# Patient Record
Sex: FEMALE | Race: WHITE | NOT HISPANIC OR LATINO | URBAN - METROPOLITAN AREA
[De-identification: names, ages, dates, MRNs, and addresses within clinical notes are randomized per-mention and may not be internally consistent; named-entity substitution may affect disease eponyms.]

---

## 2017-07-13 ENCOUNTER — TRANSFERRED RECORDS (OUTPATIENT)
Dept: HEALTH INFORMATION MANAGEMENT | Facility: CLINIC | Age: 77
End: 2017-07-13

## 2017-08-10 ENCOUNTER — OFFICE VISIT (OUTPATIENT)
Dept: OPHTHALMOLOGY | Facility: CLINIC | Age: 77
End: 2017-08-10
Attending: OPHTHALMOLOGY
Payer: MEDICARE

## 2017-08-10 DIAGNOSIS — H35.52 RP (RETINITIS PIGMENTOSA): Primary | ICD-10-CM

## 2017-08-10 DIAGNOSIS — H91.93 BILATERAL HEARING LOSS, UNSPECIFIED HEARING LOSS TYPE: ICD-10-CM

## 2017-08-10 PROCEDURE — T1013 SIGN LANG/ORAL INTERPRETER: HCPCS | Mod: U3,ZF

## 2017-08-10 PROCEDURE — 99213 OFFICE O/P EST LOW 20 MIN: CPT | Mod: ZF

## 2017-08-10 RX ORDER — POLYVINYL ALCOHOL 14 MG/ML
1 SOLUTION/ DROPS OPHTHALMIC
COMMUNITY
Start: 2016-03-22

## 2017-08-10 RX ORDER — IBUPROFEN 400 MG/1
400 TABLET, FILM COATED ORAL
COMMUNITY
Start: 2016-05-04

## 2017-08-10 RX ORDER — RISPERIDONE 2 MG/1
2 TABLET, ORALLY DISINTEGRATING ORAL
COMMUNITY
Start: 2017-03-03

## 2017-08-10 RX ORDER — CELECOXIB 100 MG/1
100 CAPSULE ORAL
COMMUNITY
Start: 2017-06-22

## 2017-08-10 RX ORDER — POLYETHYLENE GLYCOL 3350 17 G/17G
1 POWDER, FOR SOLUTION ORAL
COMMUNITY
Start: 2017-06-02

## 2017-08-10 RX ORDER — CALCIUM CARBONATE 500 MG/1
500 TABLET, CHEWABLE ORAL
COMMUNITY
Start: 2016-03-22

## 2017-08-10 RX ORDER — ASPIRIN 81 MG/1
81 TABLET ORAL
COMMUNITY
Start: 2016-09-08

## 2017-08-10 RX ORDER — ATORVASTATIN CALCIUM 20 MG/1
20 TABLET, FILM COATED ORAL
COMMUNITY
Start: 2017-07-14

## 2017-08-10 RX ORDER — ASPIRIN 81 MG
100 TABLET, DELAYED RELEASE (ENTERIC COATED) ORAL
COMMUNITY
Start: 2017-07-14

## 2017-08-10 RX ORDER — ACETAMINOPHEN 325 MG/1
TABLET ORAL
COMMUNITY
Start: 2016-04-22

## 2017-08-10 RX ORDER — FLUTICASONE PROPIONATE 50 MCG
SPRAY, SUSPENSION (ML) NASAL
COMMUNITY
Start: 2017-05-24

## 2017-08-10 ASSESSMENT — CONF VISUAL FIELD
OD_SUPERIOR_TEMPORAL_RESTRICTION: 1
OD_INFERIOR_TEMPORAL_RESTRICTION: 1
OS_SUPERIOR_NASAL_RESTRICTION: 1
OD_SUPERIOR_NASAL_RESTRICTION: 1
OS_INFERIOR_NASAL_RESTRICTION: 1
OD_INFERIOR_NASAL_RESTRICTION: 1
OS_INFERIOR_TEMPORAL_RESTRICTION: 1
OS_SUPERIOR_TEMPORAL_RESTRICTION: 1

## 2017-08-10 ASSESSMENT — VISUAL ACUITY
OD_SC: LP
METHOD: SNELLEN - LINEAR
OS_SC: LP

## 2017-08-10 ASSESSMENT — SLIT LAMP EXAM - LIDS
COMMENTS: NORMAL
COMMENTS: NORMAL

## 2017-08-10 ASSESSMENT — EXTERNAL EXAM - LEFT EYE: OS_EXAM: NORMAL

## 2017-08-10 ASSESSMENT — TONOMETRY
IOP_METHOD: TONOPEN
OS_IOP_MMHG: 24
OD_IOP_MMHG: 22

## 2017-08-10 ASSESSMENT — EXTERNAL EXAM - RIGHT EYE: OD_EXAM: NORMAL

## 2017-08-10 NOTE — NURSING NOTE
Chief Complaints and History of Present Illnesses   Patient presents with     Consult For     blindness of BE     HPI    Affected eye(s):  Both   Symptoms:     Tearing   No itching   No burning   Eye discharge (Comment: BE)      Frequency:  Constant       Do you have eye pain now?:  No      Comments:  Referred by Dr Derrell Soto  Pt is deaf and blind  Pt unable to tell what caused blindness and when   Lisa Diaz COA 4:03 PM August 10, 2017

## 2017-08-10 NOTE — PROGRESS NOTES
I have confirmed the patient's and reviewed Past Medical History, Past Surgical History, Social History, Family History, Problem List, Medication List and agree with Tech note.    CC: blurry vision both eyes     HPI: patient in home for hearing impaired and also has visual acuity loss both eyes, reason not known     Assessment/plan:   1.  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  Pseudophakia both eyes    - monitor     RTC as needed       Luis Enrique Garcia MD PhD.  Professor & Chair

## 2017-08-10 NOTE — MR AVS SNAPSHOT
After Visit Summary   8/10/2017    Sandi George    MRN: [id number]           Patient Information     Date Of Birth          1940        Visit Information        Provider Department      8/10/2017 2:45 PM Karmen Smyth; Luis Enrique Munguia MD; [facility]        Today's Diagnoses     RP (retinitis pigmentosa)    -  1    Bilateral hearing loss, unspecified hearing loss type           Follow-ups after your visit        Follow-up notes from your care team     Return if symptoms worsen or fail to improve, for RP OU .      Who to contact     Please call your clinic at [phone] to:    Ask questions about your health    Make or cancel appointments    Discuss your medicines    Learn about your test results    Speak to your doctor   If you have compliments or concerns about an experience at your clinic, or if you wish to file a complaint, please contact [facility] Patient Relations at [phone] or email us at [email or web address]         Additional Information About Your Visit        MyChart Information     [employer] is an electronic gateway that provides easy, online access to your medical records. With [employer], you can request a clinic appointment, read your test results, renew a prescription or communicate with your care team.     To sign up for [employer] visit the website at [email or web address]   You will be asked to enter the access code listed below, as well as some personal information. Please follow the directions to create your username and password.     Your access code is: [email or web address]  Expires: 10/25/2017  6:31 AM     Your access code will  in 90 days. If you need help or a new code, please contact your [facility] or call [phone] for assistance.        Care EveryWhere ID     This is your Care EveryWhere ID. This could be used by other organizations to access your [facility]  records  [id number]         Blood Pressure from Last 3 Encounters:   No data found for BP    Weight from Last 3 Encounters:   No data found for Wt              Today, you had the following     No orders found for display       Primary Care Provider    None Specified       No primary provider on file.        Equal Access to Services     LAURENSANDRA LIZ : Hadii gemma grady selma Liu, waalexxda luqadaha, qaybta kaalmada dalton, lobito dee lamichelaadrien . So [facility] [phone].    ATENCIÓN: Si habla español, tiene a infante disposición servicios gratuitos de asistencia lingüística. Llame al [phone].    We comply with applicable federal civil rights laws and [place] laws. We do not discriminate on the basis of race, color, national origin, age, disability sex, sexual orientation or gender identity.            Thank you!     Thank you for choosing [facility]  for your care. Our goal is always to provide you with excellent care. Hearing back from our patients is one way we can continue to improve our services. Please take a few minutes to complete the written survey that you may receive in the mail after your visit with us. Thank you!             Your Updated Medication List - Protect others around you: Learn how to safely use, store and throw away your medicines at www.disposemymeds.org.          This list is accurate as of: 8/10/17  4:44 PM.  Always use your most recent med list.                   Brand Name Dispense Instructions for use Diagnosis    acetaminophen 325 MG tablet    TYLENOL          ANTACID 500 MG chewable tablet   Generic drug:  calcium carbonate      Take 500 mg by mouth        aspirin EC 81 MG EC tablet      Take 81 mg by mouth        atorvastatin 20 MG tablet    LIPITOR     Take 20 mg by mouth        celecoxib 100 MG capsule    celeBREX     Take 100 mg by mouth        COUGH SYRUP 100 MG/5ML Syrp   Generic drug:  guaiFENesin      Take 50 mg by mouth        diclofenac 1 % Gel topical  gel    VOLTAREN          docusate sodium 100 MG tablet    COLACE     Take 100 mg by mouth        fluticasone 50 MCG/ACT spray    FLONASE     INHALE 1 SPRAY IN EACH NOSTRIL  TWICE A DAY        ibuprofen 400 MG tablet    ADVIL/MOTRIN     Take 400 mg by mouth        polyethylene glycol Packet    MIRALAX/GLYCOLAX     Take 1 packet by mouth        polyvinyl alcohol 1.4 % ophthalmic solution    LIQUIFILM TEARS     1 drop        risperiDONE 2 MG ODT tab    risperDAL M-TABS     Place 2 mg under the tongue        sertraline 50 MG tablet    ZOLOFT     Take 50 mg by mouth

## 2017-08-10 NOTE — LETTER
8/10/2017       RE: Sandi George  [address] [place] [address]     Dear Colleague,    Thank you for referring your patient, Sandi George, to the EYE CL[facility] at [facility]. Please see a copy of my visit note below.    I have confirmed the patient's and reviewed Past Medical History, Past Surgical History, Social History, Family History, Problem List, Medication List and agree with Tech note.    CC: blurry vision both eyes     HPI: patient in home for hearing impaired and also has visual acuity loss both eyes, reason not known     Assessment/plan:   1.  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  Pseudophakia both eyes    - monitor     RTC as needed       Luis Enrique Garcia MD PhD.  Professor & Chair